# Patient Record
Sex: MALE | Race: WHITE | NOT HISPANIC OR LATINO | URBAN - METROPOLITAN AREA
[De-identification: names, ages, dates, MRNs, and addresses within clinical notes are randomized per-mention and may not be internally consistent; named-entity substitution may affect disease eponyms.]

---

## 2020-10-20 ENCOUNTER — TELEPHONE (OUTPATIENT)
Dept: PULMONOLOGY | Facility: CLINIC | Age: 55
End: 2020-10-20

## 2021-02-03 ENCOUNTER — TELEMEDICINE (OUTPATIENT)
Dept: PULMONOLOGY | Facility: CLINIC | Age: 56
End: 2021-02-03
Payer: COMMERCIAL

## 2021-02-03 DIAGNOSIS — J31.0 CHRONIC RHINITIS: ICD-10-CM

## 2021-02-03 DIAGNOSIS — G47.33 OBSTRUCTIVE SLEEP APNEA: Primary | ICD-10-CM

## 2021-02-03 PROBLEM — I10 ESSENTIAL HYPERTENSION: Status: ACTIVE | Noted: 2021-02-03

## 2021-02-03 PROBLEM — K21.9 GASTROESOPHAGEAL REFLUX DISEASE: Status: ACTIVE | Noted: 2021-02-03

## 2021-02-03 PROBLEM — F31.9 BIPOLAR DISORDER (HCC): Status: ACTIVE | Noted: 2021-02-03

## 2021-02-03 PROCEDURE — 99213 OFFICE O/P EST LOW 20 MIN: CPT | Performed by: INTERNAL MEDICINE

## 2021-02-03 RX ORDER — FLUTICASONE PROPIONATE 50 MCG
2 SPRAY, SUSPENSION (ML) NASAL DAILY
Qty: 1 BOTTLE | Refills: 5 | Status: SHIPPED | OUTPATIENT
Start: 2021-02-03 | End: 2021-03-12 | Stop reason: SDUPTHER

## 2021-02-03 NOTE — ASSESSMENT & PLAN NOTE
This patient is fully compliant with CPAP therapy and is improved with the use of this device  Compliance measured in November was nearly 100%  Patient remains asymptomatic with the use of this device  Advised to continue on call if problems or questions

## 2021-02-03 NOTE — PROGRESS NOTES
Virtual Brief Visit    Assessment/Plan:    Problem List Items Addressed This Visit        Respiratory    Obstructive sleep apnea - Primary       This patient is fully compliant with CPAP therapy and is improved with the use of this device  Compliance measured in November was nearly 100%  Patient remains asymptomatic with the use of this device  Advised to continue on call if problems or questions  Reason for visit is No chief complaint on file  Encounter provider Isaac Morton MD    Provider located at [address] [place] [id number] [phone]    Recent Visits  No visits were found meeting these conditions  Showing recent visits within past 7 days and meeting all other requirements     Today's Visits  Date Type Provider Dept   02/03/21 Telemedicine Isaac Morton MD [facility] today's visits and meeting all other requirements     Future Appointments  No visits were found meeting these conditions  Showing future appointments within next 150 days and meeting all other requirements        After connecting through telephone, the patient was identified by name and date of birth  Liliya Eugene was informed that this is a telemedicine visit and that the visit is being conducted through telephone  Other methods to assure confidentiality were taken   No one else was in the room  He acknowledged consent and understanding of privacy and security of the platform  The patient has agreed to participate and understands he can discontinue the visit at any time  Patient is aware this is a billable service  Subjective    Liliya Eugene is a 63 y o  male   This patient has been on treatment for sleep apnea since 2018  No technical problems with the use of this device  Recent compliance is excellent    Patient senses much improvement in terms of the quality of his sleep and does not have daytime sleepiness  No problem with headache or nocturia  Comorbidity of hypertension  Complains of intermittent nasal stuffiness  Previously on fluticasone would like to renew this medicine  Past Medical History:   Diagnosis Date    Bipolar disorder ([address] )     Generalized anxiety disorder     High blood pressure     High cholesterol     Hypertension     Hypothyroidism     Sleep apnea        Past Surgical History:   Procedure Laterality Date    CHOLECYSTECTOMY      UMBILICAL HERNIA REPAIR      umbilical     WISDOM TOOTH EXTRACTION         No current outpatient medications on file  No current facility-administered medications for this visit  Not on File    Review of Systems   Constitutional: Negative for activity change and unexpected weight change  HENT: Positive for congestion and sinus pressure  Respiratory: Positive for apnea  Negative for cough and shortness of breath  Cardiovascular: Negative for chest pain and palpitations  Genitourinary: Negative for enuresis  Neurological: Negative for headaches  There were no vitals filed for this visit  I spent Ten minutes with patient today in which greater than 50% of the time was spent in counseling/coordination of care regarding Obstructive sleep apnea    VIRTUAL VISIT DISCLAIMER    Mellissa Harris acknowledges that he has consented to an online visit or consultation  He understands that the online visit is based solely on information provided by him, and that, in the absence of a face-to-face physical evaluation by the physician, the diagnosis he receives is both limited and provisional in terms of accuracy and completeness  This is not intended to replace a full medical face-to-face evaluation by the physician  Mellissa Harris understands and accepts these terms

## 2021-03-12 DIAGNOSIS — J31.0 CHRONIC RHINITIS: ICD-10-CM

## 2021-03-12 RX ORDER — ATORVASTATIN CALCIUM 40 MG/1
40 TABLET, FILM COATED ORAL DAILY
COMMUNITY

## 2021-03-12 RX ORDER — CLONAZEPAM 1 MG/1
1 TABLET ORAL 2 TIMES DAILY PRN
COMMUNITY
Start: 2021-01-13 | End: 2021-07-12

## 2021-03-12 RX ORDER — LEVOTHYROXINE SODIUM 137 UG/1
137 TABLET ORAL DAILY
COMMUNITY

## 2021-03-12 RX ORDER — TRAZODONE HYDROCHLORIDE 100 MG/1
100 TABLET ORAL DAILY PRN
COMMUNITY
Start: 2021-01-13

## 2021-03-12 RX ORDER — FLUOXETINE HYDROCHLORIDE 40 MG/1
40 CAPSULE ORAL DAILY
COMMUNITY
Start: 2021-01-13

## 2021-03-12 RX ORDER — OMEPRAZOLE 20 MG/1
20 CAPSULE, DELAYED RELEASE ORAL DAILY
COMMUNITY

## 2021-03-12 RX ORDER — LAMOTRIGINE 200 MG/1
200 TABLET ORAL DAILY
COMMUNITY
Start: 2021-01-13

## 2021-03-12 RX ORDER — ATENOLOL 25 MG/1
25 TABLET ORAL DAILY
COMMUNITY

## 2021-03-15 RX ORDER — FLUTICASONE PROPIONATE 50 MCG
2 SPRAY, SUSPENSION (ML) NASAL DAILY
Qty: 1 BOTTLE | Refills: 5 | Status: SHIPPED | OUTPATIENT
Start: 2021-03-15

## 2021-09-02 ENCOUNTER — OFFICE VISIT (OUTPATIENT)
Dept: PULMONOLOGY | Facility: CLINIC | Age: 56
End: 2021-09-02
Payer: COMMERCIAL

## 2021-09-02 VITALS
BODY MASS INDEX: 47.74 KG/M2 | SYSTOLIC BLOOD PRESSURE: 122 MMHG | TEMPERATURE: 98.3 F | HEIGHT: 68 IN | OXYGEN SATURATION: 97 % | HEART RATE: 63 BPM | DIASTOLIC BLOOD PRESSURE: 82 MMHG | WEIGHT: 315 LBS

## 2021-09-02 DIAGNOSIS — G47.33 OBSTRUCTIVE SLEEP APNEA: Primary | ICD-10-CM

## 2021-09-02 PROCEDURE — 99213 OFFICE O/P EST LOW 20 MIN: CPT | Performed by: INTERNAL MEDICINE

## 2021-09-02 RX ORDER — FEXOFENADINE HCL 180 MG/1
180 TABLET ORAL DAILY
COMMUNITY
Start: 2021-08-09

## 2021-09-02 NOTE — PROGRESS NOTES
Assessment/Plan:    Obstructive sleep apnea  This patient continues use CPAP effectively and is getting great benefit in terms of resolving daytime sleepiness and preventing symptomatic airway obstructive events  He still has some headache related primarily to sinus problems according to him  His machine has been recalled  However his 5 year anniversary is shortly coming up within the next several months  After discussion of benefits and risks of continuing CPAP I advised him to continue with the CPAP until his machine can be replaced  I put in a request for a new machine  Diagnoses and all orders for this visit:    Obstructive sleep apnea  -     Cpap New DME    Other orders  -     fexofenadine (ALLEGRA) 180 MG tablet; Take 180 mg by mouth daily  -     Cholecalciferol 50 MCG (2000 UT) CAPS; Take 1 capsule by mouth daily  -     NON FORMULARY; Inhale 0 5 Doses as needed Medical marijuana          Subjective:      Patient ID: Janes Gonzalez is a 63 y o  male  This patient was last seen in person regarding his sleep apnea in October 2019 and we talked by phone in February with a 2021  He continues the CPAP effectively nightly  He gets great benefit in terms of symptoms with resolution of daytime sleepiness and avoidance of the obstructive airway events which happened when he does not have a CPAP on  He still gets some morning headache which he attributes to sinus related problems  No nocturia  Patient's machine has been recalled  Is here for advice  Thinks he started with CPAP approximately January of 2017  I would suggest that his 5 year anniversary is coming up shortly and he will be able to have the machine replaced by his insurance  We discussed the the benefits and potential risks of continuing to use the CPAP machine  After discussion we decided he would be best served by continuing with this device and I put in a request for a new machine  15 minute visit all with discussion          The following portions of the patient's history were reviewed and updated as appropriate: allergies, current medications, past family history, past medical history, past social history, past surgical history and problem list     Review of Systems   Constitutional: Negative for activity change and unexpected weight change  Respiratory: Positive for apnea  Genitourinary: Negative for enuresis  Musculoskeletal: Positive for back pain  Neurological: Positive for headaches  Objective:      /82 (BP Location: Left arm, Patient Position: Sitting, Cuff Size: Large)   Pulse 63   Temp 98 3 °F (36 8 °C) (Tympanic)   Ht 5' 7 5" (1 715 m)   Wt (!) 150 kg (331 lb 6 oz)   SpO2 97%   BMI 51 13 kg/m²          Physical Exam  Vitals reviewed  Constitutional:       Appearance: He is obese  He is not ill-appearing  Neurological:      Mental Status: He is alert and oriented to person, place, and time     Psychiatric:         Mood and Affect: Mood normal          Behavior: Behavior normal

## 2021-09-02 NOTE — ASSESSMENT & PLAN NOTE
This patient continues use CPAP effectively and is getting great benefit in terms of resolving daytime sleepiness and preventing symptomatic airway obstructive events  He still has some headache related primarily to sinus problems according to him  His machine has been recalled  However his 5 year anniversary is shortly coming up within the next several months  After discussion of benefits and risks of continuing CPAP I advised him to continue with the CPAP until his machine can be replaced  I put in a request for a new machine

## 2022-05-26 ENCOUNTER — TELEPHONE (OUTPATIENT)
Dept: PULMONOLOGY | Facility: CLINIC | Age: 57
End: 2022-05-26

## 2022-05-26 NOTE — TELEPHONE ENCOUNTER
Pt called in asking about the forms that were faxed over from [facility], he was wondering if they were sent back yet, and if we could give him a call back when they were faxed back over to [facility]

## 2022-09-15 ENCOUNTER — OFFICE VISIT (OUTPATIENT)
Dept: PULMONOLOGY | Facility: CLINIC | Age: 57
End: 2022-09-15
Payer: MEDICARE

## 2022-09-15 VITALS
HEART RATE: 66 BPM | BODY MASS INDEX: 47.74 KG/M2 | WEIGHT: 315 LBS | DIASTOLIC BLOOD PRESSURE: 84 MMHG | TEMPERATURE: 98.2 F | OXYGEN SATURATION: 94 % | HEIGHT: 68 IN | SYSTOLIC BLOOD PRESSURE: 130 MMHG

## 2022-09-15 DIAGNOSIS — G47.33 OBSTRUCTIVE SLEEP APNEA: Primary | ICD-10-CM

## 2022-09-15 PROCEDURE — 99212 OFFICE O/P EST SF 10 MIN: CPT | Performed by: INTERNAL MEDICINE

## 2022-09-15 RX ORDER — FLUOXETINE HYDROCHLORIDE 20 MG/1
60 CAPSULE ORAL DAILY
COMMUNITY
Start: 2022-08-17

## 2022-09-15 RX ORDER — LAMOTRIGINE 100 MG/1
TABLET ORAL
COMMUNITY
Start: 2022-06-24

## 2022-09-15 RX ORDER — METHOCARBAMOL 750 MG/1
750 TABLET, FILM COATED ORAL 4 TIMES DAILY PRN
COMMUNITY
Start: 2022-06-15

## 2022-09-15 NOTE — PROGRESS NOTES
Answers for HPI/ROS submitted by the patient on 9/8/2022  Chronicity: chronic  When did you first notice your symptoms?: more than 1 year ago  How often do your symptoms occur?: daily  Since you first noticed this problem, how has it changed?: unchanged  Have you had a change in appetite?: No  Do you have chest pain?: No  Do you have shortness of breath that occurs with effort or exertion?: No  Do you have ear congestion?: No  Do you have ear pain?: No  Do you have a fever?: No  Do you have headaches?: Yes  Do you have heartburn?: Yes  Do you have fatigue?: Yes  Do you have muscle pain?: Yes  Do you have nasal congestion?: Yes  Do you have shortness of breath when lying flat?: No  Do you have shortness of breath when you wake up?: No  Do you have post-nasal drip?: Yes  Do you have a runny nose?: Yes  Do you have sneezing?: No  Do you have a sore throat?: No  Do you have sweats?: No  Do you have trouble swallowing?: No  Have you experienced weight loss?: No  Which of the following makes your symptoms worse?: nothing  Which of the following makes your symptoms better?: nothing    Assessment/Plan:    Obstructive sleep apnea  Doing well with CPAP  Patient received a replacement machine for the recall  He needed new supplies ordered so I took care that for him  Continues to use this nightly with the good relief of symptoms and no ongoing sleep-related symptoms  No technical problems with the use of the mask or the machine  Call if problems or questions  Diagnoses and all orders for this visit:    Obstructive sleep apnea  -     Cancel: PAP DME Resupply/Reorder  -     PAP DME Resupply/Reorder    BMI 50 0-59 9, adult (HCC)    Other orders  -     FLUoxetine (PROzac) 20 mg capsule; Take 60 mg by mouth daily  -     methocarbamol (ROBAXIN) 750 mg tablet; Take 750 mg by mouth 4 (four) times a day as needed  -     lamoTRIgine (LaMICtal) 100 mg tablet; take 1/2 tablet by mouth once daily for 2 weeks then INCREASE to     (REFER TO PRESCRIPTION NOTES)  Subjective:      Patient ID: Kelsea Abarca is a 61 y o  male  Patient returns for evaluation of obstructive sleep apnea  He has been on therapy since 2017  He did receive a replacement machine in the last calendar year  So far no technical problems with this device  He needs his equipment and supplies reordered  This was done today  Denies daytime sleepiness and morning headache  No nocturia  No technical problems with the use of a fullface mask or the machine  He feels that treatment with this CPAP has lowered his blood pressure  10 minute visit all with discussion      primary symptoms  Associated symptoms include headaches (Presumably sinus) and myalgias  Pertinent negatives include no chest pain, fever or sore throat  The following portions of the patient's history were reviewed and updated as appropriate: allergies, current medications, past family history, past medical history, past social history, past surgical history and problem list     Review of Systems   Constitutional: Negative for activity change, appetite change, fever and unexpected weight change  HENT: Positive for postnasal drip and rhinorrhea  Negative for ear pain, sneezing, sore throat and trouble swallowing  Respiratory: Positive for apnea  Cardiovascular: Negative for chest pain  Genitourinary: Negative for enuresis  Musculoskeletal: Positive for myalgias  Neurological: Positive for headaches (Presumably sinus)  Objective:      /84 (BP Location: Left arm, Patient Position: Sitting, Cuff Size: Standard)   Pulse 66   Temp 98 2 °F (36 8 °C) (Tympanic)   Ht 5' 7 5" (1 715 m)   Wt (!) 149 kg (327 lb 12 8 oz)   SpO2 94%   BMI 50 58 kg/m²          Physical Exam  Vitals reviewed  Constitutional:       General: He is not in acute distress  Appearance: He is obese  He is not ill-appearing     Neurological:      Mental Status: He is alert and oriented to person, place, and time     Psychiatric:         Mood and Affect: Mood normal          Behavior: Behavior normal

## 2022-09-15 NOTE — LETTER
September 15, 2022     Jossie Douglass, [address]  [facility] [id number]    Patient: Sybil Ahumada   YOB: 1965   Date of Visit: 9/15/2022       Dear Dr Karmen Cordoba: Thank you for referring Sybil Ahumada to me for evaluation  Below are my notes for this consultation  If you have questions, please do not hesitate to call me  I look forward to following your patient along with you           Sincerely,        Stanley Wise MD        CC: No Recipients

## 2022-09-15 NOTE — ASSESSMENT & PLAN NOTE
Doing well with CPAP  Patient received a replacement machine for the recall  He needed new supplies ordered so I took care that for him  Continues to use this nightly with the good relief of symptoms and no ongoing sleep-related symptoms  No technical problems with the use of the mask or the machine  Call if problems or questions

## 2022-09-28 ENCOUNTER — TELEPHONE (OUTPATIENT)
Dept: PULMONOLOGY | Facility: CLINIC | Age: 57
End: 2022-09-28

## 2022-09-29 NOTE — TELEPHONE ENCOUNTER
Kimberly modi Luzerne called asking for a sleep study from 2016 for this pt to be faxed over to  ATTN: Kimberly Brush 
Patient sticking with [facility] now   3 sleep studies faxed to Amy
[phone]

## 2022-09-30 LAB
